# Patient Record
Sex: FEMALE | Race: WHITE | NOT HISPANIC OR LATINO
[De-identification: names, ages, dates, MRNs, and addresses within clinical notes are randomized per-mention and may not be internally consistent; named-entity substitution may affect disease eponyms.]

---

## 2019-03-26 ENCOUNTER — TRANSCRIPTION ENCOUNTER (OUTPATIENT)
Age: 59
End: 2019-03-26

## 2019-04-04 PROBLEM — Z00.00 ENCOUNTER FOR PREVENTIVE HEALTH EXAMINATION: Status: ACTIVE | Noted: 2019-04-04

## 2019-04-05 ENCOUNTER — LABORATORY RESULT (OUTPATIENT)
Age: 59
End: 2019-04-05

## 2019-04-05 ENCOUNTER — APPOINTMENT (OUTPATIENT)
Dept: PEDIATRIC ALLERGY IMMUNOLOGY | Facility: CLINIC | Age: 59
End: 2019-04-05
Payer: COMMERCIAL

## 2019-04-05 VITALS — DIASTOLIC BLOOD PRESSURE: 69 MMHG | WEIGHT: 137.79 LBS | SYSTOLIC BLOOD PRESSURE: 113 MMHG | HEART RATE: 75 BPM

## 2019-04-05 DIAGNOSIS — J30.1 ALLERGIC RHINITIS DUE TO POLLEN: ICD-10-CM

## 2019-04-05 DIAGNOSIS — Z83.6 FAMILY HISTORY OF OTHER DISEASES OF THE RESPIRATORY SYSTEM: ICD-10-CM

## 2019-04-05 DIAGNOSIS — Z87.09 PERSONAL HISTORY OF OTHER DISEASES OF THE RESPIRATORY SYSTEM: ICD-10-CM

## 2019-04-05 DIAGNOSIS — Z78.9 OTHER SPECIFIED HEALTH STATUS: ICD-10-CM

## 2019-04-05 DIAGNOSIS — R21 RASH AND OTHER NONSPECIFIC SKIN ERUPTION: ICD-10-CM

## 2019-04-05 DIAGNOSIS — L20.9 ATOPIC DERMATITIS, UNSPECIFIED: ICD-10-CM

## 2019-04-05 PROCEDURE — 99243 OFF/OP CNSLTJ NEW/EST LOW 30: CPT | Mod: 25

## 2019-04-05 PROCEDURE — 95004 PERQ TESTS W/ALRGNC XTRCS: CPT

## 2019-04-05 RX ORDER — MOMETASONE FUROATE 1 MG/G
0.1 CREAM TOPICAL
Qty: 1 | Refills: 1 | Status: ACTIVE | COMMUNITY
Start: 2019-04-05 | End: 1900-01-01

## 2019-04-05 NOTE — HISTORY OF PRESENT ILLNESS
[Asthma] : asthma [Allergic Rhinitis] : allergic rhinitis [Eczematous rashes] : eczematous rashes [Food Allergies] : food allergies [de-identified] : 58 year old female who developed a prickly bumpy rash initially on the abdomen and then generalized on the arms, and legs.  The symptoms occurred a month ago and have improved since then.  The patient was seen in the Urgent care setting as well and treated with medrol but symptoms returned when the medication was stopped. The diagnosis given was keratosis pilaris.  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  The patient is a  and does have dust around.  There is associated pruritus, especially on the antecubital fossae.  The symptoms now are on the inner thighs, and bilateral antecubital area, not on the face or back.  No similar symptoms have occurred in the past. \par

## 2019-04-05 NOTE — SOCIAL HISTORY
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

## 2019-04-05 NOTE — CONSULT LETTER
[Dear  ___] : Dear  [unfilled], [Consult Letter:] : I had the pleasure of evaluating your patient, [unfilled]. [Please see my note below.] : Please see my note below. [Consult Closing:] : Thank you very much for allowing me to participate in the care of this patient.  If you have any questions, please do not hesitate to contact me. [Sincerely,] : Sincerely, [FreeTextEntry2] : Compa Morales MD [FreeTextEntry3] : Erin Garcia MD, FAAAAI, FACROSAI\par Associate , \par Assistant Fellowship Training ,\par Director, [facility]\par Division of Allergy and Immunology\par [facility]\par [facility]\par , Pediatrics and Medicine\par [facility]\par [address], [address]\[place] [address]\par [phone]\par

## 2019-04-05 NOTE — PHYSICAL EXAM
[Alert] : alert [Well Nourished] : well nourished [Healthy Appearance] : healthy appearance [No Acute Distress] : no acute distress [Well Developed] : well developed [Normal Pupil & Iris Size/Symmetry] : normal pupil and iris size and symmetry [No Discharge] : no discharge [No Photophobia] : no photophobia [Sclera Not Icteric] : sclera not icteric [Normal TMs] : both tympanic membranes were normal [Normal Lips/Tongue] : the lips and tongue were normal [Normal Outer Ear/Nose] : the ears and nose were normal in appearance [Normal Tonsils] : normal tonsils [No Thrush] : no thrush [Supple] : the neck was supple [Normal Rate and Effort] : normal respiratory rhythm and effort [No Crackles] : no crackles [No Retractions] : no retractions [Bilateral Audible Breath Sounds] : bilateral audible breath sounds [Normal Rate] : heart rate was normal  [Normal S1, S2] : normal S1 and S2 [No murmur] : no murmur [Regular Rhythm] : with a regular rhythm [Soft] : abdomen soft [Not Tender] : non-tender [Not Distended] : not distended [Normal Cervical Lymph Nodes] : cervical [Skin Intact] : skin intact  [No Rash] : no rash [No Skin Lesions] : no skin lesions [No Joint Swelling or Erythema] : no joint swelling or erythema [No clubbing] : no clubbing [No Edema] : no edema [No Cyanosis] : no cyanosis [Normal Mood] : mood was normal [Normal Affect] : affect was normal [Alert, Awake, Oriented as Age-Appropriate] : alert, awake, oriented as age appropriate [Boggy Nasal Turbinates] : no boggy and/or pale nasal turbinates

## 2019-04-05 NOTE — IMPRESSION
[Allergy Testing Weeds] : weeds [Allergy Testing Dust Mite] : dust mites [Allergy Testing Mixed Feathers] : feathers [Allergy Testing Cockroach] : cockroach [Allergy Testing Dog] : dog [Allergy Testing Cat] : cat [] : molds [Allergy Testing Trees] : trees [Allergy Testing Grasses] : grasses

## 2019-04-15 LAB
AMER BEECH IGE QN: 0
BOXELDER IGE QN: <0.1 KUA/L
COTTONWOOD IGE QN: <0.1 KUA/L
DEPRECATED AMER BEECH IGE RAST QL: <0.1 KUA/L
DEPRECATED BOXELDER IGE RAST QL: 0
DEPRECATED COTTONWOOD IGE RAST QL: 0
DEPRECATED RED CEDAR IGE RAST QL: 0
DEPRECATED SILVER BIRCH IGE RAST QL: 0
DEPRECATED WHITE ASH IGE RAST QL: 0
DEPRECATED WHITE HICKORY IGE RAST QL: 0
DEPRECATED WHITE OAK IGE RAST QL: 0
MULBERRY (T70) CLASS: 0
MULBERRY (T70) CONC: <0.1 KUA/L
RED CEDAR IGE QN: <0.1 KUA/L
SILVER BIRCH IGE QN: <0.1 KUA/L
WHITE ASH IGE QN: <0.1 KUA/L
WHITE ELM IGE QN: 0
WHITE ELM IGE QN: <0.1 KUA/L
WHITE HICKORY IGE QN: <0.1 KUA/L
WHITE OAK IGE QN: <0.1 KUA/L

## 2023-11-30 ENCOUNTER — TRANSCRIPTION ENCOUNTER (OUTPATIENT)
Age: 63
End: 2023-11-30